# Patient Record
Sex: FEMALE | Race: WHITE | NOT HISPANIC OR LATINO | URBAN - METROPOLITAN AREA
[De-identification: names, ages, dates, MRNs, and addresses within clinical notes are randomized per-mention and may not be internally consistent; named-entity substitution may affect disease eponyms.]

---

## 2023-10-15 PROBLEM — R87.619 ABNORMAL PAP SMEAR OF CERVIX: Status: ACTIVE | Noted: 2023-10-15

## 2023-10-15 PROBLEM — E66.813 CLASS 3 OBESITY WITH ALVEOLAR HYPOVENTILATION AND BODY MASS INDEX (BMI) OF 40.0 TO 44.9 IN ADULT: Status: ACTIVE | Noted: 2023-10-15

## 2023-10-15 PROBLEM — H81.10 BPPV (BENIGN PAROXYSMAL POSITIONAL VERTIGO): Status: ACTIVE | Noted: 2023-10-15

## 2023-10-15 PROBLEM — D17.24 LIPOMA OF LEFT LOWER EXTREMITY: Status: ACTIVE | Noted: 2023-10-15

## 2023-10-15 PROBLEM — Z30.9 CONTRACEPTION MANAGEMENT: Status: ACTIVE | Noted: 2023-10-15

## 2023-10-15 PROBLEM — L68.0 HIRSUTISM: Status: ACTIVE | Noted: 2023-10-15

## 2023-10-15 PROBLEM — R51.9 HEADACHE: Status: ACTIVE | Noted: 2023-10-15

## 2023-10-15 PROBLEM — N76.0 BACTERIAL VAGINOSIS: Status: ACTIVE | Noted: 2023-10-15

## 2023-10-15 PROBLEM — N92.6 IRREGULAR MENSES: Status: ACTIVE | Noted: 2023-10-15

## 2023-10-15 PROBLEM — E66.01 CLASS 3 SEVERE OBESITY DUE TO EXCESS CALORIES WITH BODY MASS INDEX (BMI) OF 40.0 TO 44.9 IN ADULT (MULTI): Status: ACTIVE | Noted: 2023-10-15

## 2023-10-15 PROBLEM — E66.2 CLASS 3 OBESITY WITH ALVEOLAR HYPOVENTILATION AND BODY MASS INDEX (BMI) OF 40.0 TO 44.9 IN ADULT (MULTI): Status: ACTIVE | Noted: 2023-10-15

## 2023-10-15 PROBLEM — B97.7 HPV IN FEMALE: Status: ACTIVE | Noted: 2023-10-15

## 2023-10-15 PROBLEM — B96.89 BACTERIAL VAGINOSIS: Status: ACTIVE | Noted: 2023-10-15

## 2023-10-15 PROBLEM — R42 ORTHOSTATIC DIZZINESS: Status: ACTIVE | Noted: 2023-10-15

## 2023-10-15 PROBLEM — E66.813 CLASS 3 SEVERE OBESITY DUE TO EXCESS CALORIES WITH BODY MASS INDEX (BMI) OF 40.0 TO 44.9 IN ADULT: Status: ACTIVE | Noted: 2023-10-15

## 2023-10-15 PROBLEM — E28.2 PCOS (POLYCYSTIC OVARIAN SYNDROME): Status: ACTIVE | Noted: 2023-10-15

## 2023-10-15 RX ORDER — UBIDECARENONE 75 MG
1 CAPSULE ORAL DAILY
COMMUNITY
Start: 2021-06-24

## 2023-10-15 RX ORDER — ONDANSETRON 4 MG/1
4 TABLET, ORALLY DISINTEGRATING ORAL EVERY 8 HOURS
COMMUNITY
Start: 2018-05-29 | End: 2023-10-17 | Stop reason: ALTCHOICE

## 2023-10-15 RX ORDER — SPIRONOLACTONE 100 MG/1
TABLET, FILM COATED ORAL
COMMUNITY
Start: 2021-11-19 | End: 2024-03-27 | Stop reason: SDUPTHER

## 2023-10-15 RX ORDER — NORETHINDRONE AND ETHINYL ESTRADIOL 1 MG-35MCG
1 KIT ORAL DAILY
COMMUNITY
Start: 2021-06-24 | End: 2023-10-17 | Stop reason: ALTCHOICE

## 2023-10-15 RX ORDER — ASPIRIN 325 MG
50000 TABLET, DELAYED RELEASE (ENTERIC COATED) ORAL
COMMUNITY
Start: 2022-05-12 | End: 2024-02-07 | Stop reason: ALTCHOICE

## 2023-10-15 RX ORDER — FERROUS SULFATE 325(65) MG
65 TABLET ORAL 3 TIMES WEEKLY
COMMUNITY
Start: 2021-06-24 | End: 2024-02-07 | Stop reason: ALTCHOICE

## 2023-10-15 RX ORDER — METFORMIN HYDROCHLORIDE 500 MG/1
1500 TABLET, EXTENDED RELEASE ORAL DAILY
COMMUNITY
Start: 2021-08-16 | End: 2023-10-17 | Stop reason: ALTCHOICE

## 2023-10-15 RX ORDER — CITALOPRAM 20 MG/1
TABLET, FILM COATED ORAL
COMMUNITY
Start: 2021-06-22 | End: 2023-10-17 | Stop reason: ALTCHOICE

## 2023-10-17 ENCOUNTER — OFFICE VISIT (OUTPATIENT)
Dept: OBSTETRICS AND GYNECOLOGY | Facility: CLINIC | Age: 23
End: 2023-10-17
Payer: COMMERCIAL

## 2023-10-17 ENCOUNTER — LAB (OUTPATIENT)
Dept: LAB | Facility: LAB | Age: 23
End: 2023-10-17
Payer: COMMERCIAL

## 2023-10-17 VITALS
HEART RATE: 76 BPM | SYSTOLIC BLOOD PRESSURE: 111 MMHG | DIASTOLIC BLOOD PRESSURE: 74 MMHG | BODY MASS INDEX: 35.52 KG/M2 | HEIGHT: 66 IN | WEIGHT: 221 LBS

## 2023-10-17 DIAGNOSIS — E28.2 PCOS (POLYCYSTIC OVARIAN SYNDROME): ICD-10-CM

## 2023-10-17 DIAGNOSIS — Z12.4 SCREENING FOR MALIGNANT NEOPLASM OF CERVIX: ICD-10-CM

## 2023-10-17 DIAGNOSIS — Z01.411 ENCNTR FOR GYN EXAM (GENERAL) (ROUTINE) W ABNORMAL FINDINGS: Primary | ICD-10-CM

## 2023-10-17 LAB
25(OH)D3 SERPL-MCNC: 43 NG/ML (ref 30–100)
ANION GAP SERPL CALC-SCNC: 13 MMOL/L (ref 10–20)
BUN SERPL-MCNC: 12 MG/DL (ref 6–23)
CALCIUM SERPL-MCNC: 9.3 MG/DL (ref 8.6–10.3)
CHLORIDE SERPL-SCNC: 101 MMOL/L (ref 98–107)
CHOLEST SERPL-MCNC: 199 MG/DL (ref 0–199)
CHOLESTEROL/HDL RATIO: 3.7
CO2 SERPL-SCNC: 27 MMOL/L (ref 21–32)
CREAT SERPL-MCNC: 0.7 MG/DL (ref 0.5–1.05)
EST. AVERAGE GLUCOSE BLD GHB EST-MCNC: 91 MG/DL
GFR SERPL CREATININE-BSD FRML MDRD: >90 ML/MIN/1.73M*2
GLUCOSE SERPL-MCNC: 82 MG/DL (ref 74–99)
HBA1C MFR BLD: 4.8 %
HDLC SERPL-MCNC: 54.1 MG/DL
LDLC SERPL CALC-MCNC: 125 MG/DL
NON HDL CHOLESTEROL: 145 MG/DL (ref 0–149)
POTASSIUM SERPL-SCNC: 4.3 MMOL/L (ref 3.5–5.3)
SODIUM SERPL-SCNC: 137 MMOL/L (ref 136–145)
TRIGL SERPL-MCNC: 101 MG/DL (ref 0–149)
VLDL: 20 MG/DL (ref 0–40)

## 2023-10-17 PROCEDURE — 88175 CYTOPATH C/V AUTO FLUID REDO: CPT | Mod: TC,GCY | Performed by: NURSE PRACTITIONER

## 2023-10-17 PROCEDURE — 80061 LIPID PANEL: CPT

## 2023-10-17 PROCEDURE — 83036 HEMOGLOBIN GLYCOSYLATED A1C: CPT

## 2023-10-17 PROCEDURE — 87661 TRICHOMONAS VAGINALIS AMPLIF: CPT | Performed by: NURSE PRACTITIONER

## 2023-10-17 PROCEDURE — 82306 VITAMIN D 25 HYDROXY: CPT

## 2023-10-17 PROCEDURE — 1036F TOBACCO NON-USER: CPT | Performed by: NURSE PRACTITIONER

## 2023-10-17 PROCEDURE — 88141 CYTOPATH C/V INTERPRET: CPT | Performed by: PATHOLOGY

## 2023-10-17 PROCEDURE — 87800 DETECT AGNT MULT DNA DIREC: CPT | Performed by: NURSE PRACTITIONER

## 2023-10-17 PROCEDURE — 36415 COLL VENOUS BLD VENIPUNCTURE: CPT

## 2023-10-17 PROCEDURE — 99395 PREV VISIT EST AGE 18-39: CPT | Performed by: NURSE PRACTITIONER

## 2023-10-17 PROCEDURE — 99385 PREV VISIT NEW AGE 18-39: CPT | Performed by: NURSE PRACTITIONER

## 2023-10-17 PROCEDURE — 80048 BASIC METABOLIC PNL TOTAL CA: CPT

## 2023-10-17 RX ORDER — DROSPIRENONE AND ETHINYL ESTRADIOL 0.02-3(28)
1 KIT ORAL DAILY
Qty: 84 TABLET | Refills: 3 | Status: SHIPPED | OUTPATIENT
Start: 2023-10-17 | End: 2024-04-09 | Stop reason: SDUPTHER

## 2023-10-17 ASSESSMENT — ANXIETY QUESTIONNAIRES
3. WORRYING TOO MUCH ABOUT DIFFERENT THINGS: MORE THAN HALF THE DAYS
7. FEELING AFRAID AS IF SOMETHING AWFUL MIGHT HAPPEN: MORE THAN HALF THE DAYS
6. BECOMING EASILY ANNOYED OR IRRITABLE: MORE THAN HALF THE DAYS
5. BEING SO RESTLESS THAT IT IS HARD TO SIT STILL: NOT AT ALL
2. NOT BEING ABLE TO STOP OR CONTROL WORRYING: SEVERAL DAYS
4. TROUBLE RELAXING: MORE THAN HALF THE DAYS
IF YOU CHECKED OFF ANY PROBLEMS ON THIS QUESTIONNAIRE, HOW DIFFICULT HAVE THESE PROBLEMS MADE IT FOR YOU TO DO YOUR WORK, TAKE CARE OF THINGS AT HOME, OR GET ALONG WITH OTHER PEOPLE: SOMEWHAT DIFFICULT
GAD7 TOTAL SCORE: 10
1. FEELING NERVOUS, ANXIOUS, OR ON EDGE: SEVERAL DAYS

## 2023-10-17 ASSESSMENT — PATIENT HEALTH QUESTIONNAIRE - PHQ9
10. IF YOU CHECKED OFF ANY PROBLEMS, HOW DIFFICULT HAVE THESE PROBLEMS MADE IT FOR YOU TO DO YOUR WORK, TAKE CARE OF THINGS AT HOME, OR GET ALONG WITH OTHER PEOPLE: SOMEWHAT DIFFICULT
1. LITTLE INTEREST OR PLEASURE IN DOING THINGS: MORE THAN HALF THE DAYS
2. FEELING DOWN, DEPRESSED OR HOPELESS: NOT AT ALL
SUM OF ALL RESPONSES TO PHQ9 QUESTIONS 1 AND 2: 2

## 2023-10-17 ASSESSMENT — COLUMBIA-SUICIDE SEVERITY RATING SCALE - C-SSRS
6. HAVE YOU EVER DONE ANYTHING, STARTED TO DO ANYTHING, OR PREPARED TO DO ANYTHING TO END YOUR LIFE?: NO
2. HAVE YOU ACTUALLY HAD ANY THOUGHTS OF KILLING YOURSELF?: NO

## 2023-10-17 ASSESSMENT — PAIN SCALES - GENERAL: PAINLEVEL: 0-NO PAIN

## 2023-10-17 ASSESSMENT — ENCOUNTER SYMPTOMS
LOSS OF SENSATION IN FEET: 0
OCCASIONAL FEELINGS OF UNSTEADINESS: 0
DEPRESSION: 0

## 2023-10-17 NOTE — PROGRESS NOTES
"Brie Cordova is a 23 y.o. who presents today for her annual gynecologic exam with complaints Has PCOS. Doesn't feel like it is well managed. Has tried oral contraception but had hair loss. On Spironolactone and feels like it is helping.     Concerns with intercourse:  no     Last pap:   2020 ASCUS HPV Not done  History of abnormal pap: yes - All under age 25  HPV vaccine:  unsure  Last mammogram: Never  Colon screen: Never    History of STIs: no    Patient concern for STI: no    Family history of breast, uterine, ovarian or colon cancer:      Past medical, surgical, family and social histories reviewed and updated as needed.    /74   Pulse 76   Ht 1.676 m (5' 6\")   Wt 100 kg (221 lb)   LMP  (LMP Unknown) Comment: No period in almost 11 months, no birth control  BMI 35.67 kg/m²      Physical Exam  Constitutional:       Appearance: Normal appearance.   Genitourinary:      Bladder and rectum normal.      Right Labia: No skin changes or Bartholin's cyst.     Left Labia: No skin changes or Bartholin's cyst.     Vulva exam comments: Normal.      No vaginal prolapse present.     No vaginal atrophy present.     Vaginal exam comments: Normal.      No cervical motion tenderness.      Cervical exam comments: Normal.   Breasts:     Breasts are soft.     Right: Normal.      Left: Normal.   HENT:      Head: Normocephalic.   Pulmonary:      Effort: Pulmonary effort is normal.   Abdominal:      General: There is no distension.      Palpations: Abdomen is soft.   Musculoskeletal:         General: Normal range of motion.      Cervical back: Normal range of motion and neck supple.   Neurological:      General: No focal deficit present.      Mental Status: She is alert and oriented to person, place, and time.   Skin:     General: Skin is warm and dry.   Psychiatric:         Mood and Affect: Mood normal.         Behavior: Behavior normal.         Thought Content: Thought content normal.         Judgment: Judgment normal. "   Vitals and nursing note reviewed.            Diagnoses and all orders for this visit:  Encntr for gyn exam (general) (routine) w abnormal findings  PCOS (polycystic ovarian syndrome)  -     Lipid Panel; Future  -     Basic metabolic panel; Future  -     Hemoglobin A1c; Future  -     Referral to Endocrinology; Future  -     Vitamin D 25-Hydroxy,Total (for eval of Vitamin D levels); Future  -     drospirenone-ethinyl estradioL (Elisa Pierre) 3-0.02 mg tablet; Take 1 tablet by mouth once daily.  Screening for malignant neoplasm of cervix  -     THINPREP PAP    Follow up with Dr. Fowler in 3 months and with Dr. Angelina Beltran for weight management.

## 2023-10-19 LAB
C TRACH RRNA SPEC QL NAA+PROBE: NEGATIVE
N GONORRHOEA DNA SPEC QL PROBE+SIG AMP: NEGATIVE
T VAGINALIS RRNA SPEC QL NAA+PROBE: NEGATIVE

## 2023-10-31 ENCOUNTER — PATIENT MESSAGE (OUTPATIENT)
Dept: OBSTETRICS AND GYNECOLOGY | Facility: CLINIC | Age: 23
End: 2023-10-31
Payer: COMMERCIAL

## 2023-10-31 DIAGNOSIS — B96.89 BACTERIAL VAGINOSIS: Primary | ICD-10-CM

## 2023-10-31 DIAGNOSIS — N76.0 BACTERIAL VAGINOSIS: Primary | ICD-10-CM

## 2023-10-31 LAB
CYTOLOGY CMNT CVX/VAG CYTO-IMP: NORMAL
LAB AP HPV HR: NORMAL
LAB AP PAP ADDITIONAL TESTS: NORMAL
LABORATORY COMMENT REPORT: NORMAL
PATH REPORT.TOTAL CANCER: NORMAL

## 2023-11-01 RX ORDER — METRONIDAZOLE 500 MG/1
500 TABLET ORAL 2 TIMES DAILY
Qty: 14 TABLET | Refills: 0 | Status: SHIPPED | OUTPATIENT
Start: 2023-11-01 | End: 2023-11-08

## 2023-11-02 ENCOUNTER — TELEPHONE (OUTPATIENT)
Dept: PRIMARY CARE | Facility: CLINIC | Age: 23
End: 2023-11-02

## 2023-11-02 ENCOUNTER — APPOINTMENT (OUTPATIENT)
Dept: ENDOCRINOLOGY | Facility: CLINIC | Age: 23
End: 2023-11-02
Payer: COMMERCIAL

## 2024-01-19 ENCOUNTER — APPOINTMENT (OUTPATIENT)
Dept: OBSTETRICS AND GYNECOLOGY | Facility: CLINIC | Age: 24
End: 2024-01-19
Payer: COMMERCIAL

## 2024-02-07 ENCOUNTER — OFFICE VISIT (OUTPATIENT)
Dept: ENDOCRINOLOGY | Facility: CLINIC | Age: 24
End: 2024-02-07
Payer: COMMERCIAL

## 2024-02-07 VITALS
HEART RATE: 92 BPM | WEIGHT: 200.1 LBS | DIASTOLIC BLOOD PRESSURE: 85 MMHG | SYSTOLIC BLOOD PRESSURE: 131 MMHG | HEIGHT: 66 IN | TEMPERATURE: 98.3 F | BODY MASS INDEX: 32.16 KG/M2

## 2024-02-07 DIAGNOSIS — E28.2 PCOS (POLYCYSTIC OVARIAN SYNDROME): ICD-10-CM

## 2024-02-07 DIAGNOSIS — E66.8 CONSTITUTIONAL OBESITY: ICD-10-CM

## 2024-02-07 DIAGNOSIS — Z76.89 ENCOUNTER FOR WEIGHT MANAGEMENT: ICD-10-CM

## 2024-02-07 PROCEDURE — 99204 OFFICE O/P NEW MOD 45 MIN: CPT | Performed by: INTERNAL MEDICINE

## 2024-02-07 PROCEDURE — 1036F TOBACCO NON-USER: CPT | Performed by: INTERNAL MEDICINE

## 2024-02-07 PROCEDURE — 3008F BODY MASS INDEX DOCD: CPT | Performed by: INTERNAL MEDICINE

## 2024-02-07 NOTE — PROGRESS NOTES
Patient is seen at the request of Friend/self for my opinion regarding weight management/pcos. My final recommendations will be communicated back to the requesting provider by way of shared medical record.    NEW  Subjective   Brie Cordova is a 23 y.o. female with a hx of PCOS, hirsutism, obesity who presents for weight management and obesity.    1. Weight history: weight has been an issue since childhood- DX PCOS at 13 years old. Was put on Metformin- did not help.   --Any previous programs: Tried Gluten and dairy free for a year- lost 40lbs    2. Sleep: trouble getting to sleep      3. Stress: 6/10, is a       4. Exercise: not consistent. Walks on the treadmill 2 times a week for 30-40 minutes      5. Appetite control: uncontrolled   Breakfast: 4/7, protein bar, cereal, oatmeal  Lunch: usually skips   Snacking during the day: protein bar, trail mix, crackers  Dinner: protein with vegetable and carb  Snacking after dinner: craving more sugar     Any personal history of pancreatitis?: no  Any personal or family history of medullary thyroid cancer or MEN (multiple endocrine neoplasia)?: Cousin had thyroid cancer- unknown specific type      Current Outpatient Medications:     cyanocobalamin (Vitamin B-12) 500 mcg tablet, Take 1 tablet (500 mcg) by mouth once daily., Disp: , Rfl:     drospirenone-ethinyl estradioL (Gabby, Gianvi) 3-0.02 mg tablet, Take 1 tablet by mouth once daily., Disp: 84 tablet, Rfl: 3    spironolactone (Aldactone) 100 mg tablet, Take by mouth.  TAKE 2 TABLETS BY MOUTH EVERY MORNING AND 1 TABLET EVERY EVENING, Disp: , Rfl:     ROS:  System: normal  Eyes : no visual changes  Neck : no tenderness, no new lumps/bumps  Respiratory : no SOB  Cardiovascular : no chest pain, no palpitations  Gastro-Intestinal : no abdominal concerns  Neurological : no numbness or tingling in the extremities  Musculoskeletal : no joint paint, no muscle pain  Skin : no unusual rashes  Psychiatric : no  depression, no anxiety  See HPI for Endocrine ROS    Past Medical History:   Diagnosis Date    Chlamydial infection, unspecified 08/03/2018    Chlamydia infection    Contact with and (suspected) exposure to infections with a predominantly sexual mode of transmission 01/17/2019    STD exposure    Encounter for initial prescription of contraceptive pills 03/09/2020    Encounter for initial prescription of contraceptive pills    Encounter for screening for infections with a predominantly sexual mode of transmission 03/09/2020    Screening for STDs (sexually transmitted diseases)    Encounter for screening for malignant neoplasm of cervix 04/25/2018    Cervical cancer screening    Encounter for screening for other infectious and parasitic diseases 05/10/2018    Screening for chlamydial disease    Personal history of other diseases of the female genital tract 09/17/2019    History of vaginal discharge    Personal history of other specified conditions 08/01/2018    History of dysuria    Personal history of urinary (tract) infections 05/01/2019    History of urinary tract infection    Trichomonal vulvovaginitis 01/17/2019    Trichomonal vulvovaginitis    Unspecified symptoms and signs involving the genitourinary system 03/09/2020    UTI symptoms    Urinary tract infection, site not specified 11/15/2018    Acute lower UTI       Past Surgical History:   Procedure Laterality Date    OTHER SURGICAL HISTORY  06/17/2020    No history of surgery       Social History     Socioeconomic History    Marital status: Single     Spouse name: Not on file    Number of children: Not on file    Years of education: Not on file    Highest education level: Not on file   Occupational History    Occupation: [employer]ist   Tobacco Use    Smoking status: Never    Smokeless tobacco: Never   Vaping Use    Vaping Use: Every day    Substances: Nicotine    Devices: Disposable   Substance and Sexual Activity    Alcohol use: Yes    Drug use: Yes      "Types: Marijuana    Sexual activity: Yes     Partners: Male   Other Topics Concern    Not on file   Social History Narrative    Not on file     Social Determinants of Health     Financial Resource Strain: Not on file   Food Insecurity: Not on file   Transportation Needs: Not on file   Physical Activity: Not on file   Stress: Not on file   Social Connections: Not on file   Intimate Partner Violence: Not on file   Housing Stability: Not on file       Objective    Physical Exam:  Blood pressure 131/85, pulse 92, temperature 36.8 °C (98.3 °F), height 1.676 m (5' 6\"), weight 90.8 kg (200 lb 1.6 oz).  General : alert and oriented X3, no acute distress  Eyes : EOMI   Neck : non tender, supple  Thyroid : no palpable nodules  Heart : regular rate and rhythm  ABD : no obvious abnormalities, soft to palpation  Extremities : no edema  Neuro : normal  Other :    Assessment/Plan   Brie Cordova is a 23 y.o. female with a hx of PCOS, hirsutism, obesity who presents for weight management and obesity.    1. Weight Management : Reviewed the principles of energy metabolism, caloric intake and expenditure, and rationale for a treatment program.  Also reinforced need for reduced calorie, low fat diet and increased physical activity.    We reviewed the possibility of starting an interdisciplinary lifestyle intervention program involving improvement of the diet, a personalized exercise program, efforts to reduce the stress and the possibility of using appetite suppressant medications in an effort to help with the weight loss process.  The patient expressed interest in the plan.    2. Nutrition : I discussed trying one of the diet approaches we have here in the program : Mediterranean lifestyle, ketosis diet.  The patient will be referred to the Registered Dietician for education on their diet of choice.  The dietary booklet was provided in the visit today.    2/7/24: 200lb, 32.30kg/m2    3. Sleep : trouble getting to sleep    4. Stress " : 6/10, is a      5. Exercise : walking -- encouraged strength training.    6. Appetite : is high a lot.  Discussed AOM's in length.  10/17/23 : bcz4w=3.8%  She will review and let me know.  Also a family member had thyroid cancer - she will ask for more details.    7. Baseline thyroid US in radiology.    Follow up in a group visit.  Kyara Beltran MD

## 2024-02-08 RX ORDER — PHENTERMINE HYDROCHLORIDE 37.5 MG/1
TABLET ORAL
Qty: 30 TABLET | Refills: 2 | Status: SHIPPED | OUTPATIENT
Start: 2024-02-08

## 2024-02-08 NOTE — TELEPHONE ENCOUNTER
----- Message from Brie Cordova sent at 2/8/2024  1:35 PM EST -----  Regarding: Prescription decision   Contact: [phone]  Hi I wanted to thank you for the information during our appt. After some research on side effects, and other’s experiences I would like to start on phentermine, if you still believe that is a good choice for me. I will also be slowly starting the lifestyle stages you suggested and incorporating strength training into my walking sessions ASAP! Thank you in advance!   
LOV: 2/7/24  NOV: not scheduled   
None

## 2024-02-13 ENCOUNTER — NUTRITION (OUTPATIENT)
Dept: ENDOCRINOLOGY | Facility: CLINIC | Age: 24
End: 2024-02-13
Payer: COMMERCIAL

## 2024-02-13 VITALS — HEIGHT: 66 IN | BODY MASS INDEX: 32.3 KG/M2

## 2024-02-13 DIAGNOSIS — Z71.3 DIETARY COUNSELING: Primary | ICD-10-CM

## 2024-02-13 PROCEDURE — 97802 MEDICAL NUTRITION INDIV IN: CPT | Performed by: DIETITIAN, REGISTERED

## 2024-02-13 NOTE — PATIENT INSTRUCTIONS
- Please refer to your book entitled: Your Mediterranean Meal Plan, and handout provided and follow the guidelines as discussed.  - The Healthy Plate style of eating can be a helpful tool for incorporating healthy balanced meals in appropriate portions. (Healthy Plate: Start with a 9-inch diameter plate. Fill 1/2 the plate with non-starchy vegetables, 1/4 of the plate with whole grains or starchy vegetables, and 1/4 of the plate with a lean source of protein.   - Consider pre-planning healthy meals and snacks for the week for both home meals and things to eat when at work. Can refer to Your Mediterranean Meal Plan booklet for menu and recipe ideas to help get you started.  - Consider having a meal replacement bar or protein shake along with a serving of fruit when time does not allow for a meal at work.  - Consider tracking daily food intake for accountability with food choices and portions and aim for 7047-3844 calories daily.   - Can also consider following healthy eating pattern with tracking servings of foods as follows:  - Aim for 2-3 servings of fruit per day.  - Aim for a minimum of 3 servings of non-starchy vegetables per day.  - Non-starchy vegetables are unlimited however!   - Aim for at least 3 servings of legumes (beans and lentils) per week.   - Aim for at least 1 Tbsp. of Extra Virgin Olive Oil (EVOO) daily, and please limit to no more than 4 servings of healthy fats daily.  - Aim for 2-3 servings of omega 3 fatty acid rich fish (herring, mackerel, salmon and tuna) per week.   - Aim for at least 3 servings of nuts (almonds, hazelnuts and walnuts) per week.   - Aim for 4-5 servings of whole grains and non-starchy vegetables per day.  - Aim for 1 serving of fat-free or low-fat dairy (cheese, milk and yogurt) or dairy alternatives per day (almond, coconut, oat and rice).  - Aim for 6-8 ounces of lean protein sources daily such as chicken, turkey, fish, and eggs.  - Avoid red meat (beef, pork, veal and  lamb) OR limit to no more than 3-4 ounces per week.  - Can consider incorporating intermittent fasting with eating window of 8 hours daily and fasting of 16 hours daily as discussed.  - Aim for 64 ounces of water daily.  - Aim for 150 minutes of moderate-intensity physical activity per week.   - Follow-up as scheduled with Dr. Angelina Beltran.  - Follow-up with nutrition in as scheduled.

## 2024-02-13 NOTE — PROGRESS NOTES
"Initial Nutrition Assessment    Patient was referred to nutrition by Dr. Angelina Beltran for weight management/desire to lose weight. Other PMHX significant for PCOS.      Diet recall reveals an inconsistent meal pattern with frequently missed meals, as well as reported intakes of some calorically dense foods/processed snack foods all likely contributing to lack of desired weight loss/contributing to weight gain over time. Fluids meeting recommendations in type and amount with water as primary beverage. Pt is incorporating consistent physical activity, meeting low-end of weekly recommendations. See all interventions/recommendations below as discussed during visit this day.      Patient reported symptoms: Difficulty losing weight    Anthropometrics:  Height:   Ht Readings from Last 1 Encounters:   02/07/24 1.676 m (5' 6\")      Weight:   Wt Readings from Last 10 Encounters:   02/07/24 90.8 kg (200 lb 1.6 oz)   10/17/23 100 kg (221 lb)   10/04/22 97.5 kg (215 lb)   11/08/21 103 kg (227 lb)   08/16/21 103 kg (227 lb 5 oz)   04/22/21 96.6 kg (213 lb)   01/28/21 99.4 kg (219 lb 1 oz)   08/13/20 103 kg (228 lb)   06/16/20 99.5 kg (219 lb 6 oz)   03/09/20 101 kg (223 lb)      Current BMI:   BMI Readings from Last 1 Encounters:   02/07/24 32.30 kg/m²        Labs:  Lab Results   Component Value Date    HGBA1C 4.8 10/17/2023      Lab Results   Component Value Date    CHOL 199 10/17/2023     Lab Results   Component Value Date    HDL 54.1 10/17/2023     Lab Results   Component Value Date    LDLCALC 125 (H) 10/17/2023     Lab Results   Component Value Date    TRIG 101 10/17/2023       Malnutrition Screening:  Significant Unintentional weight loss: No  Eating less than 75% of usual intake for more than 2 weeks: No  Potential Signs of Inflammation: No    Recommended Malnutrition Diagnosis: No malnutrition identified    Diet Recall-  Breakfast- skips OR has a protein bar at work when time allows  Lunch- snacks only usually on " things as below  Dinner- various proteins, starches and vegetables  Daily Snacks- granola bars, protein bars, trail mix plus other snacks in the evenings after work which can vary  Beverages- water throughout the day (80+ ounces daily), occasional OJ (twice weekly)  Alcohol- glass of wine once weekly or less   Physical Activity- treadmill for 30 minutes a couple days a week    Other pertinent patient reported Information:  - Past weight loss attempts include: dairy and gluten-free in the past with success with weight loss   - Works as a a [employer] with frequent inability to eat depending upon schedule  - Snacks on various things at work versus eating meals as a result  - Snacking increased amounts at night with snack/processed foods by report which she feels is biggest obstacle with weight loss at this time.    Nutrition Diagnosis: Food and nutrition-related knowledge deficit related to lack of prior exposure to information as evidenced by BMI above normative standard for age and gender.    Readiness to Learn:  Cognitive ability: Alert and oriented  Motivation to learn: Interested  Family Support: Unable to assess- family not present  Instruction provided to: Patient  Patient learns best by: Multiple methods  Factors affecting learning: None   Physical limitations affecting learning: None    Education Materials Provided:   Your Mediterranean Meal Plan Booklet provided during recent visit with Dr. Angelina Beltran and reviewed during visit this day. Meal Replacement Options Handout and Mediterranean diet handout.    Nutrition Interventions/Recommendations for 2/13/2024:  - Please refer to your book entitled: Your Mediterranean Meal Plan, and handout provided and follow the guidelines as discussed.  - The Healthy Plate style of eating can be a helpful tool for incorporating healthy balanced meals in appropriate portions. (Healthy Plate: Start with a 9-inch diameter plate. Fill 1/2 the plate with non-starchy  vegetables, 1/4 of the plate with whole grains or starchy vegetables, and 1/4 of the plate with a lean source of protein.   - Consider pre-planning healthy meals and snacks for the week for both home meals and things to eat when at work. Can refer to Your Mediterranean Meal Plan booklet for menu and recipe ideas to help get you started.  - Consider having a meal replacement bar or protein shake along with a serving of fruit when time does not allow for a meal at work.  - Consider tracking daily food intake for accountability with food choices and portions and aim for 4752-3172 calories daily.   - Can also consider following healthy eating pattern with tracking servings of foods as follows:  - Aim for 2-3 servings of fruit per day.  - Aim for a minimum of 3 servings of non-starchy vegetables per day.  - Non-starchy vegetables are unlimited however!   - Aim for at least 3 servings of legumes (beans and lentils) per week.   - Aim for at least 1 Tbsp. of Extra Virgin Olive Oil (EVOO) daily, and please limit to no more than 4 servings of healthy fats daily.  - Aim for 2-3 servings of omega 3 fatty acid rich fish (herring, mackerel, salmon and tuna) per week.   - Aim for at least 3 servings of nuts (almonds, hazelnuts and walnuts) per week.   - Aim for 4-5 servings of whole grains and non-starchy vegetables per day.  - Aim for 1 serving of fat-free or low-fat dairy (cheese, milk and yogurt) or dairy alternatives per day (almond, coconut, oat and rice).  - Aim for 6-8 ounces of lean protein sources daily such as chicken, turkey, fish, and eggs.  - Avoid red meat (beef, pork, veal and lamb) OR limit to no more than 3-4 ounces per week.  - Can consider incorporating intermittent fasting with eating window of 8 hours daily and fasting of 16 hours daily as discussed.  - Aim for 64 ounces of water daily.  - Aim for 150 minutes of moderate-intensity physical activity per week.   - Follow-up as scheduled with Dr. Alfaro  Devin.  - Follow-up with nutrition in as scheduled.      Nutrition Monitoring & Evaluation: Weight loss of 1-2 lbs per week and adherence to recommendations and patient stated goals    Need for follow-up: Individual Nutrition Visit in 4-6 weeks    Referred by: Dr. Angelina Beltran    [facility] Billing Type: Medical Nutrition Assessment, each 15 min increment, for 4 increments.    SIGNATURE:   Leydi Adler RD, LD, CDCES                                                             DATE:   2/13/2024

## 2024-02-22 ENCOUNTER — HOSPITAL ENCOUNTER (OUTPATIENT)
Dept: RADIOLOGY | Facility: HOSPITAL | Age: 24
Discharge: HOME | End: 2024-02-22
Payer: COMMERCIAL

## 2024-02-22 DIAGNOSIS — E66.8 CONSTITUTIONAL OBESITY: ICD-10-CM

## 2024-02-22 PROCEDURE — 76536 US EXAM OF HEAD AND NECK: CPT

## 2024-02-22 PROCEDURE — 76536 US EXAM OF HEAD AND NECK: CPT | Performed by: RADIOLOGY

## 2024-02-24 ENCOUNTER — TELEPHONE (OUTPATIENT)
Dept: ENDOCRINOLOGY | Facility: CLINIC | Age: 24
End: 2024-02-24
Payer: COMMERCIAL

## 2024-02-24 NOTE — TELEPHONE ENCOUNTER
US THYROID; ;  2/22/2024     FINDINGS:  Thyroid gland is not enlarged and the thyroid parenchyma appears  homogeneous. Right thyroid lobe measures 5.8 x 1.5 x 1.6 cm in the  left thyroid lobe measures 5.3 x 1.1 x 1.6 cm. Thyroid isthmus  measures 2 mm in AP diameter. No discrete nodule is demonstrated.      IMPRESSION:  Unremarkable thyroid ultrasound.          MACRO:  None      Signed by: Manuel Rosales 2/23/2024 5:50 PM  Dictation workstation:   [id number]  ---------------------------------------------------------------    Please let her know : the thyroid US was NORMAL.  Thanks,  Kyara Beltran MD

## 2024-03-26 ENCOUNTER — TELEPHONE (OUTPATIENT)
Dept: ENDOCRINOLOGY | Facility: CLINIC | Age: 24
End: 2024-03-26

## 2024-03-26 ENCOUNTER — APPOINTMENT (OUTPATIENT)
Dept: ENDOCRINOLOGY | Facility: CLINIC | Age: 24
End: 2024-03-26
Payer: COMMERCIAL

## 2024-03-26 NOTE — TELEPHONE ENCOUNTER
Patient is asking if Dr. Saida Beltran will fill a script for spironolactone 100 mg tablets that a different provider prescribed earlier Called patient and left message letting her Dr. Yusuf is out of the office but message will be sent to her for her to determine if she wants patient to continue this medication. Lauren Jaime LPN

## 2024-03-27 DIAGNOSIS — E28.2 PCOS (POLYCYSTIC OVARIAN SYNDROME): Primary | ICD-10-CM

## 2024-03-27 RX ORDER — SPIRONOLACTONE 100 MG/1
100 TABLET, FILM COATED ORAL 2 TIMES DAILY
Qty: 60 TABLET | Refills: 0 | Status: SHIPPED | OUTPATIENT
Start: 2024-03-27 | End: 2024-04-26

## 2024-03-27 NOTE — TELEPHONE ENCOUNTER
Spoke to the patient and she used to see Doretha Terry CNP before she left. Needs refill of her spironolactone to get her to the 4/9/24 appt. Confirmed dosage, patient states she takes 100 mg morning and night.   Order pended and sent to Dr. Crockett.

## 2024-04-05 NOTE — TELEPHONE ENCOUNTER
Patient sent [employer] message stating that she will make an appointment with a gynecologist to get the script filled.

## 2024-04-09 ENCOUNTER — OFFICE VISIT (OUTPATIENT)
Dept: OBSTETRICS AND GYNECOLOGY | Facility: CLINIC | Age: 24
End: 2024-04-09
Payer: COMMERCIAL

## 2024-04-09 VITALS
SYSTOLIC BLOOD PRESSURE: 134 MMHG | BODY MASS INDEX: 32.62 KG/M2 | DIASTOLIC BLOOD PRESSURE: 71 MMHG | HEIGHT: 66 IN | WEIGHT: 203 LBS

## 2024-04-09 DIAGNOSIS — E28.2 PCOS (POLYCYSTIC OVARIAN SYNDROME): Primary | ICD-10-CM

## 2024-04-09 PROCEDURE — 99214 OFFICE O/P EST MOD 30 MIN: CPT | Performed by: OBSTETRICS & GYNECOLOGY

## 2024-04-09 PROCEDURE — 1036F TOBACCO NON-USER: CPT | Performed by: OBSTETRICS & GYNECOLOGY

## 2024-04-09 PROCEDURE — 3008F BODY MASS INDEX DOCD: CPT | Performed by: OBSTETRICS & GYNECOLOGY

## 2024-04-09 RX ORDER — DROSPIRENONE AND ETHINYL ESTRADIOL 0.02-3(28)
1 KIT ORAL DAILY
Qty: 84 TABLET | Refills: 3 | Status: SHIPPED | OUTPATIENT
Start: 2024-04-09

## 2024-04-09 NOTE — PROGRESS NOTES
Brie Cordova is a 23 y.o. female who presents with a chief complaint of Establish Care (Establish care after previous gyn no longer with practice/No complaints)      SUBJECTIVE  Patient presents to establish with new physician.  She has a history of PCOS and is currently on birth control and spironolactone.  She had she tried metformin in the past but does not feel like she got a lot out of it.  She does need a refill on her spironolactone.  She is due for her annual in October    Past Medical History:   Diagnosis Date    Chlamydial infection, unspecified 08/03/2018    Chlamydia infection    Contact with and (suspected) exposure to infections with a predominantly sexual mode of transmission 01/17/2019    STD exposure    Encounter for initial prescription of contraceptive pills 03/09/2020    Encounter for initial prescription of contraceptive pills    Encounter for screening for infections with a predominantly sexual mode of transmission 03/09/2020    Screening for STDs (sexually transmitted diseases)    Encounter for screening for malignant neoplasm of cervix 04/25/2018    Cervical cancer screening    Encounter for screening for other infectious and parasitic diseases 05/10/2018    Screening for chlamydial disease    Personal history of other diseases of the female genital tract 09/17/2019    History of vaginal discharge    Personal history of other specified conditions 08/01/2018    History of dysuria    Personal history of urinary (tract) infections 05/01/2019    History of urinary tract infection    Trichomonal vulvovaginitis 01/17/2019    Trichomonal vulvovaginitis    Unspecified symptoms and signs involving the genitourinary system 03/09/2020    UTI symptoms    Urinary tract infection, site not specified 11/15/2018    Acute lower UTI     Past Surgical History:   Procedure Laterality Date    OTHER SURGICAL HISTORY  06/17/2020    No history of surgery     Social History     Socioeconomic History    Marital  "status: Single     Spouse name: None    Number of children: None    Years of education: None    Highest education level: None   Occupational History    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   Tobacco Use    Smoking status: Never    Smokeless tobacco: Never   Vaping Use    Vaping Use: Some days    Substances: Nicotine    Devices: Disposable   Substance and Sexual Activity    Alcohol use: Yes    Drug use: Yes     Types: Marijuana    Sexual activity: Yes     Partners: Male   Other Topics Concern    None   Social History Narrative    None     Social Determinants of Health     Financial Resource Strain: Not on file   Food Insecurity: Not on file   Transportation Needs: Not on file   Physical Activity: Not on file   Stress: Not on file   Social Connections: Not on file   Intimate Partner Violence: Not on file   Housing Stability: Not on file     Family History   Problem Relation Name Age of Onset    No Known Problems Mother      No Known Problems Father         OB History    Para Term  AB Living   0 0 0 0 0 0   SAB IAB Ectopic Multiple Live Births   0 0 0 0 0       OBJECTIVE  No Known Allergies   (Not in a hospital admission)       Review of Systems  History obtained from the patient  General ROS: negative  Psychological ROS: negative  Gastrointestinal ROS: negative  Musculoskeletal ROS: negative  Physical Exam  General Appearance: awake, alert, oriented, in no acute distress, well developed, well nourished, and in no acute distress  Skin: there are no suspicious lesions or rashes of concern, skin color, texture, turgor are normal; there are no bruises, rashes or lesions.  Head/Face: NCAT  Eyes: No gross abnormalities., PERRL, and EOMI  Neck: neck- supple, no mass, non-tender  Back: no pain to palpation  Abdomen: Soft, non-tender, normal bowel sounds; no bruits, organomegaly or masses.  Extremities: Extremities warm to touch, pink, with no edema.  Musculoskeletal: negative    /71   Ht 1.676 m (5' 6\")   Wt 92.1 kg " (203 lb)   LMP 03/18/2024   BMI 32.77 kg/m²    Problem List Items Addressed This Visit       PCOS (polycystic ovarian syndrome) - Primary    Refill spirolactone for 1 yr  Annual October

## 2024-05-14 ENCOUNTER — NUTRITION (OUTPATIENT)
Dept: ENDOCRINOLOGY | Facility: CLINIC | Age: 24
End: 2024-05-14
Payer: COMMERCIAL

## 2024-05-14 VITALS — HEIGHT: 66 IN | BODY MASS INDEX: 31.98 KG/M2 | WEIGHT: 199 LBS

## 2024-05-14 DIAGNOSIS — Z71.3 DIETARY COUNSELING: ICD-10-CM

## 2024-05-14 PROCEDURE — 97803 MED NUTRITION INDIV SUBSEQ: CPT | Performed by: DIETITIAN, REGISTERED

## 2024-05-14 NOTE — PATIENT INSTRUCTIONS
- Continue to incorporate guidelines of the Mediterranean diet and the Healthy Plate to assist in achieving goals.   - Continue to pre-plan healthy meals and snacks for the week for both home meals and things to eat when at work.   - Continue tracking daily food intake for accountability with food choices and portions and aim for 8237-3242 calories daily.   - Can consider incorporating intermittent fasting with eating window of 8 hours daily and fasting of 16 hours daily as discussed again this day but remember, the eating window can end earlier in the evening to help reduce night time eating as discussed.  - Keep up the great work with your daily water intakes.  - Aim for 150 minutes of moderate-intensity physical activity per week and aim for twice weekly resistance training.  - Follow-up as scheduled with Dr. Angelina Beltran.  - Follow-up with nutrition in two months as scheduled.

## 2024-05-14 NOTE — PROGRESS NOTES
Follow-up Nutrition Assessment    Interval History: Patient presents for follow-up nutrition appointment for weight management/desire to lose weight. Since last nutrition visit on 2/13/24, pt with a 1 lbs weight loss; however, weight had increased to 203 lbs in April so pt with a successful 4 lbs weight loss since that time. Taking medication Phentermine and feels it is helping with appetite but that the effects are wearing off around 6 PM now so not helping as well as it was initially. This will be communicated to Dr. Alfaro for advisement.    Pt reports she is now working three-12 hour shifts a week and loving it. Finding it much easier to adhere to a consistent schedule with eating and exercise. Continues to work on incorporating healthy eating most often. Understands more now however that this is a lifestyle change and can't be looked at as a short term diet that is then stopped. Has been tracking food intake on an rony which she likes as it heightens awareness. Adding in more walking with new work schedule as well as maintaining treadmill work outs several times weekly. States she wants to start to incorporate resistance training soon as well. Still doing well with daily water intakes.       Nutrition Interventions/Recommendations from last visit scheduled on 2/13/24:  - Please refer to your book entitled: Your Mediterranean Meal Plan, and handout provided and follow the guidelines as discussed.  - The Healthy Plate style of eating can be a helpful tool for incorporating healthy balanced meals in appropriate portions. (Healthy Plate: Start with a 9-inch diameter plate. Fill 1/2 the plate with non-starchy vegetables, 1/4 of the plate with whole grains or starchy vegetables, and 1/4 of the plate with a lean source of protein.   - Consider pre-planning healthy meals and snacks for the week for both home meals and things to eat when at work. Can refer to Your Mediterranean Meal Plan booklet for menu and recipe  "ideas to help get you started.  - Consider having a meal replacement bar or protein shake along with a serving of fruit when time does not allow for a meal at work.  - Consider tracking daily food intake for accountability with food choices and portions and aim for 2365-2697 calories daily.   - Can also consider following healthy eating pattern with tracking servings of foods as follows:  - Aim for 2-3 servings of fruit per day.  - Aim for a minimum of 3 servings of non-starchy vegetables per day.  - Non-starchy vegetables are unlimited however!   - Aim for at least 3 servings of legumes (beans and lentils) per week.   - Aim for at least 1 Tbsp. of Extra Virgin Olive Oil (EVOO) daily, and please limit to no more than 4 servings of healthy fats daily.  - Aim for 2-3 servings of omega 3 fatty acid rich fish (herring, mackerel, salmon and tuna) per week.   - Aim for at least 3 servings of nuts (almonds, hazelnuts and walnuts) per week.   - Aim for 4-5 servings of whole grains and non-starchy vegetables per day.  - Aim for 1 serving of fat-free or low-fat dairy (cheese, milk and yogurt) or dairy alternatives per day (almond, coconut, oat and rice).  - Aim for 6-8 ounces of lean protein sources daily such as chicken, turkey, fish, and eggs.  - Avoid red meat (beef, pork, veal and lamb) OR limit to no more than 3-4 ounces per week.  - Can consider incorporating intermittent fasting with eating window of 8 hours daily and fasting of 16 hours daily as discussed.  - Aim for 64 ounces of water daily.  - Aim for 150 minutes of moderate-intensity physical activity per week.   - Follow-up as scheduled with Dr. Angelina Beltran.  - Follow-up with nutrition in as scheduled.       Anthropometrics:  Height:   Ht Readings from Last 1 Encounters:   04/09/24 1.676 m (5' 6\")      Weight:   Wt Readings from Last 10 Encounters:   04/09/24 92.1 kg (203 lb)   02/07/24 90.8 kg (200 lb 1.6 oz)   10/17/23 100 kg (221 lb)   10/04/22 97.5 kg " (215 lb)   11/08/21 103 kg (227 lb)   08/16/21 103 kg (227 lb 5 oz)   04/22/21 96.6 kg (213 lb)   01/28/21 99.4 kg (219 lb 1 oz)   08/13/20 103 kg (228 lb)   06/16/20 99.5 kg (219 lb 6 oz)      Current BMI:   BMI Readings from Last 1 Encounters:   04/09/24 32.77 kg/m²        Malnutrition Screening:  Significant Unintentional weight loss: No  Eating less than 75% of usual intake for more than 2 weeks: No  Potential Signs of Inflammation: No    Recommended Malnutrition Diagnosis: No malnutrition identified    Patient reported Information:  - States she is working three-12 hour shifts a week and loving it.  - Finding it much easier to adhere to a consistent schedule with eating and exercise.  - Tracking food intake on an rony which she likes as it heightens awareness.  - Continues to work on incorporating healthy eating most often. Understands more now however that this is a lifestyle change and can't be looked at as a short term diet that is then stopped.  - Still considering intermittent fasting and with good questions about such during visit. Feels eating in the evenings sometimes creating barriers with goals so discussed possibly stopping eating window earlier to help with avoiding over eating in the evenings. Pt states she wants to think about this.  - Adding in more walking with new work schedule as well as maintaining treadmill work outs several times weekly.  - Wants to start to incorporate resistance training soon.  - Still doing well with daily water intakes.  - Taking Phentermine and feels it is helping with appetite but that the effects are wearing off around 6 PM so not helping as well as it was in the beginning.    Nutrition Diagnosis: Food and nutrition-related knowledge deficit related to lack of prior exposure to information as evidenced by BMI above normative standard for age and gender.     Readiness to Learn:  Cognitive ability: Alert and oriented  Motivation to learn: Interested  Family Support:  Unable to assess- family not present  Instruction provided to: Patient  Patient learns best by: Multiple methods  Factors affecting learning: None   Physical limitations affecting learning: None    Education Materials Provided:   None this visit    Nutrition Interventions/Recommendations for 5/14/2024:  - Continue to incorporate guidelines of the Mediterranean diet and the Healthy Plate to assist in achieving goals.   - Continue to pre-plan healthy meals and snacks for the week for both home meals and things to eat when at work.   - Continue tracking daily food intake for accountability with food choices and portions and aim for 8906-7530 calories daily.   - Can consider incorporating intermittent fasting with eating window of 8 hours daily and fasting of 16 hours daily as discussed again this day but remember, the eating window can end earlier in the evening to help reduce night time eating as discussed.  - Keep up the great work with your daily water intakes.  - Aim for 150 minutes of moderate-intensity physical activity per week and aim for twice weekly resistance training.  - Follow-up as scheduled with Dr. Angelina Beltran.  - Follow-up with nutrition in two months as scheduled.      Nutrition Monitoring & Evaluation: adherence to recommendations and patient stated goals    Need for follow-up: As scheduled for Dr. Yusuf's Shared Medical Appointment (SMA) and nutrition in 2 months    Referred by: Dr. Angelina Beltran    MNT Billing Type: Medical Nutrition Re-Assessment, each 15 min increment, for 2 increments.    SIGNATURE:   Leydi Adler RD, LD, [facility]                                                             DATE:   5/14/2024

## 2024-05-24 ENCOUNTER — TELEPHONE (OUTPATIENT)
Dept: ENDOCRINOLOGY | Facility: CLINIC | Age: 24
End: 2024-05-24

## 2024-05-24 NOTE — TELEPHONE ENCOUNTER
----- Message from Kyara Beltran MD sent at 5/24/2024  9:59 AM EDT -----  Regarding: FW: Phentermine  Please let her know to try to take the phentermine later in the day - maybe around lunch time.  Not to worry - we will continue to fill the phentermine - and maybe she can see if there is a group cancellation list?    ----- Message -----  From: Leydi Adler, SUSAN, LD  Sent: 5/14/2024  10:48 AM EDT  To: Kyara Beltran MD; #  Subject: Phentermine                                      Pt was seen today and states she has been taking medication Phentermine 37.5 mg tab once daily in AM (prescribed in February) and feels it is helping with appetite but that the effects are now wearing off around 6 PM. She is asking if either the dosage or timing of medication should be changed. Also wants to know if maintaining medication will be a problem, as her visit previously scheduled for June was cancelled by the office and rescheduled to September.    Thanks,  Leydi

## 2024-06-11 ENCOUNTER — APPOINTMENT (OUTPATIENT)
Dept: ENDOCRINOLOGY | Facility: CLINIC | Age: 24
End: 2024-06-11
Payer: COMMERCIAL

## 2024-06-19 ENCOUNTER — APPOINTMENT (OUTPATIENT)
Dept: ENDOCRINOLOGY | Facility: CLINIC | Age: 24
End: 2024-06-19
Payer: COMMERCIAL

## 2024-06-19 ENCOUNTER — PATIENT MESSAGE (OUTPATIENT)
Dept: OBSTETRICS AND GYNECOLOGY | Facility: CLINIC | Age: 24
End: 2024-06-19

## 2024-06-19 VITALS
HEART RATE: 80 BPM | SYSTOLIC BLOOD PRESSURE: 108 MMHG | WEIGHT: 199.6 LBS | DIASTOLIC BLOOD PRESSURE: 74 MMHG | TEMPERATURE: 98.2 F | HEIGHT: 66 IN | BODY MASS INDEX: 32.08 KG/M2

## 2024-06-19 DIAGNOSIS — E28.2 PCOS (POLYCYSTIC OVARIAN SYNDROME): ICD-10-CM

## 2024-06-19 DIAGNOSIS — Z76.89 ENCOUNTER FOR WEIGHT MANAGEMENT: ICD-10-CM

## 2024-06-19 DIAGNOSIS — Z71.3 DIETARY COUNSELING: Primary | ICD-10-CM

## 2024-06-19 PROCEDURE — 3008F BODY MASS INDEX DOCD: CPT | Performed by: INTERNAL MEDICINE

## 2024-06-19 PROCEDURE — 99215 OFFICE O/P EST HI 40 MIN: CPT | Performed by: INTERNAL MEDICINE

## 2024-06-19 RX ORDER — PHENTERMINE HYDROCHLORIDE 37.5 MG/1
TABLET ORAL
Qty: 30 TABLET | Refills: 2 | Status: SHIPPED | OUTPATIENT
Start: 2024-06-19

## 2024-06-19 RX ORDER — SPIRONOLACTONE 100 MG/1
100 TABLET, FILM COATED ORAL 2 TIMES DAILY
Qty: 60 TABLET | Refills: 0 | Status: SHIPPED | OUTPATIENT
Start: 2024-06-19 | End: 2024-07-19

## 2024-06-19 NOTE — TELEPHONE ENCOUNTER
From: Brie Cordova  To: Bony Montalvo  Sent: 6/19/2024 10:37 AM EDT  Subject: Refill     Hi I was wondering if you could please refill my spironolactone 100mg prescription to [facility] in [place] since at our visit in April you said to reach out if I needed it refilled!     Also if you think a higher dosage would be more effective I am open to trying that but if you think I should stick with 100 then I am fine with that also. Thanks for your help!

## 2024-06-19 NOTE — PROGRESS NOTES
Subjective   Brie Cordova is a 24 y.o. female with a hx of PCOS, hirsutism, obesity who presents for weight management and obesity.    1. Nutrition: eating on smaller plates now.     2. Sleep: stable      3. Stress: managed. Has new job- works at  [employer], has moved     4. Exercise: walks on treadmill 2 times per week      5. Appetite control: controlled   AOM currently taking: Phentermine     6. Goal: get back on track with meals      Current Outpatient Medications:     cyanocobalamin (Vitamin B-12) 500 mcg tablet, Take 1 tablet (500 mcg) by mouth once daily., Disp: , Rfl:     drospirenone-ethinyl estradioL (Gabby, Gianvi) 3-0.02 mg tablet, Take 1 tablet by mouth once daily., Disp: 84 tablet, Rfl: 3    phentermine (Adipex-P) 37.5 mg tablet, Take one tablet by mouth once a day. BMI 32.30kg/m2, Disp: 30 tablet, Rfl: 2    spironolactone (Aldactone) 100 mg tablet, Take 1 tablet (100 mg) by mouth 2 times a day., Disp: 60 tablet, Rfl: 0    ROS:  System: normal  Eyes : no visual changes  Neck : no tenderness, no new lumps/bumps  Respiratory : no SOB  Cardiovascular : no chest pain, no palpitations  Gastro-Intestinal : no abdominal concerns  Neurological : no numbness or tingling in the extremities  Musculoskeletal : no joint paint, no muscle pain  Skin : no unusual rashes  Psychiatric : no depression, no anxiety  See HPI for Endocrine ROS    Past Medical History:   Diagnosis Date    Chlamydial infection, unspecified 08/03/2018    Chlamydia infection    Contact with and (suspected) exposure to infections with a predominantly sexual mode of transmission 01/17/2019    STD exposure    Encounter for initial prescription of contraceptive pills 03/09/2020    Encounter for initial prescription of contraceptive pills    Encounter for screening for infections with a predominantly sexual mode of transmission 03/09/2020    Screening for STDs (sexually transmitted diseases)    Encounter for screening for malignant neoplasm of  cervix 04/25/2018    Cervical cancer screening    Encounter for screening for other infectious and parasitic diseases 05/10/2018    Screening for chlamydial disease    Personal history of other diseases of the female genital tract 09/17/2019    History of vaginal discharge    Personal history of other specified conditions 08/01/2018    History of dysuria    Personal history of urinary (tract) infections 05/01/2019    History of urinary tract infection    Trichomonal vulvovaginitis 01/17/2019    Trichomonal vulvovaginitis    Unspecified symptoms and signs involving the genitourinary system 03/09/2020    UTI symptoms    Urinary tract infection, site not specified 11/15/2018    Acute lower UTI       Past Surgical History:   Procedure Laterality Date    OTHER SURGICAL HISTORY  06/17/2020    No history of surgery       Social History     Socioeconomic History    Marital status: Single     Spouse name: Not on file    Number of children: Not on file    Years of education: Not on file    Highest education level: Not on file   Occupational History    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   Tobacco Use    Smoking status: Never    Smokeless tobacco: Never   Vaping Use    Vaping status: Some Days    Substances: Nicotine    Devices: Disposable   Substance and Sexual Activity    Alcohol use: Yes    Drug use: Yes     Types: Marijuana    Sexual activity: Yes     Partners: Male   Other Topics Concern    Not on file   Social History Narrative    Not on file     Social Determinants of Health     Financial Resource Strain: Not on file   Food Insecurity: Not on file   Transportation Needs: Not on file   Physical Activity: Not on file   Stress: Not on file   Social Connections: Not on file   Intimate Partner Violence: Not on file   Housing Stability: Not on file       Objective    Physical Exam:  There were no vitals taken for this visit.  General : alert and oriented X3, no acute distress  Eyes : EOMI     Assessment/Plan   Brie Cordova is a 24  y.o. female with a hx of PCOS, hirsutism, obesity who presents for weight management and obesity.     1. Weight Management : Reviewed the principles of energy metabolism, caloric intake and expenditure, and rationale for a treatment program.  Also reinforced need for reduced calorie, low fat diet and increased physical activity.     We reviewed the possibility of starting an interdisciplinary lifestyle intervention program involving improvement of the diet, a personalized exercise program, efforts to reduce the stress and the possibility of using appetite suppressant medications in an effort to help with the weight loss process.  The patient expressed interest in the plan.     2. Nutrition : I discussed trying one of the diet approaches we have here in the program : Mediterranean lifestyle, ketosis diet.  Smaller plates!     2/7/24: 200lb, 32.30kg/m2  6/19/24: 199.6lb, 32.22kg/m2     3. Sleep : trouble getting to sleep     4. Stress : 6/10, was a  and now works at the  [employer].     5. Exercise : walking -- encouraged strength training.     6. Appetite : is high a lot.  Discussed AOM's in length.  10/17/23 : glx8q=6.8%  On Phentermine - continue.     7. Goal: to get back to routine (transitioned jobs and was off her routine).     Follow up in a group visit.  Kyara Beltran MD

## 2024-07-12 DIAGNOSIS — E28.2 PCOS (POLYCYSTIC OVARIAN SYNDROME): ICD-10-CM

## 2024-07-12 RX ORDER — SPIRONOLACTONE 100 MG/1
100 TABLET, FILM COATED ORAL 2 TIMES DAILY
Qty: 180 TABLET | Refills: 1 | Status: SHIPPED | OUTPATIENT
Start: 2024-07-12

## 2024-07-15 ENCOUNTER — APPOINTMENT (OUTPATIENT)
Dept: ENDOCRINOLOGY | Facility: CLINIC | Age: 24
End: 2024-07-15
Payer: COMMERCIAL

## 2024-09-16 ENCOUNTER — APPOINTMENT (OUTPATIENT)
Dept: ENDOCRINOLOGY | Facility: CLINIC | Age: 24
End: 2024-09-16
Payer: COMMERCIAL

## 2024-10-07 ENCOUNTER — TELEPHONE (OUTPATIENT)
Dept: ENDOCRINOLOGY | Facility: CLINIC | Age: 24
End: 2024-10-07
Payer: COMMERCIAL

## 2024-10-15 ENCOUNTER — LAB (OUTPATIENT)
Dept: LAB | Facility: LAB | Age: 24
End: 2024-10-15
Payer: COMMERCIAL

## 2024-10-15 ENCOUNTER — APPOINTMENT (OUTPATIENT)
Dept: OBSTETRICS AND GYNECOLOGY | Facility: CLINIC | Age: 24
End: 2024-10-15
Payer: COMMERCIAL

## 2024-10-15 VITALS
HEIGHT: 67 IN | BODY MASS INDEX: 32.05 KG/M2 | SYSTOLIC BLOOD PRESSURE: 118 MMHG | WEIGHT: 204.2 LBS | DIASTOLIC BLOOD PRESSURE: 82 MMHG

## 2024-10-15 DIAGNOSIS — Z01.419 WOMEN'S ANNUAL ROUTINE GYNECOLOGICAL EXAMINATION: Primary | ICD-10-CM

## 2024-10-15 DIAGNOSIS — E28.2 PCOS (POLYCYSTIC OVARIAN SYNDROME): ICD-10-CM

## 2024-10-15 LAB
DHEA-S SERPL-MCNC: 279 UG/DL (ref 65–395)
EST. AVERAGE GLUCOSE BLD GHB EST-MCNC: 88 MG/DL
HBA1C MFR BLD: 4.7 %
PROLACTIN SERPL-MCNC: 10.6 UG/L (ref 3–20)
TESTOST SERPL-MCNC: 35 NG/DL (ref 0–70)
TSH SERPL-ACNC: 1.83 MIU/L (ref 0.44–3.98)

## 2024-10-15 PROCEDURE — 84403 ASSAY OF TOTAL TESTOSTERONE: CPT

## 2024-10-15 PROCEDURE — 3008F BODY MASS INDEX DOCD: CPT | Performed by: OBSTETRICS & GYNECOLOGY

## 2024-10-15 PROCEDURE — 83036 HEMOGLOBIN GLYCOSYLATED A1C: CPT

## 2024-10-15 PROCEDURE — 99214 OFFICE O/P EST MOD 30 MIN: CPT | Performed by: OBSTETRICS & GYNECOLOGY

## 2024-10-15 PROCEDURE — 84443 ASSAY THYROID STIM HORMONE: CPT

## 2024-10-15 PROCEDURE — 1036F TOBACCO NON-USER: CPT | Performed by: OBSTETRICS & GYNECOLOGY

## 2024-10-15 PROCEDURE — 36415 COLL VENOUS BLD VENIPUNCTURE: CPT

## 2024-10-15 PROCEDURE — 84146 ASSAY OF PROLACTIN: CPT

## 2024-10-15 PROCEDURE — 82627 DEHYDROEPIANDROSTERONE: CPT

## 2024-10-15 PROCEDURE — 99395 PREV VISIT EST AGE 18-39: CPT | Performed by: OBSTETRICS & GYNECOLOGY

## 2024-10-15 PROCEDURE — 88175 CYTOPATH C/V AUTO FLUID REDO: CPT

## 2024-10-15 RX ORDER — METFORMIN HYDROCHLORIDE 500 MG/1
500 TABLET, EXTENDED RELEASE ORAL
Qty: 100 TABLET | Refills: 3 | Status: SHIPPED | OUTPATIENT
Start: 2024-10-15 | End: 2025-11-19

## 2024-10-15 NOTE — PROGRESS NOTES
Brie Cordova is a 24 y.o. female who presents with a chief complaint of Annual Exam      SUBJECTIVE  Patient presents for her annual.  She is having problems with polycystic ovarian syndrome.  She states that she has not had blood in work done in some time.  She is currently on birth control but would like to treat that the causes of her problems.  We discussed weight loss and taking metformin.  She could be switched Provera just for with withdrawal bleeds but she like to go over that after she has her blood work    Past Medical History:   Diagnosis Date    Chlamydial infection, unspecified 08/03/2018    Chlamydia infection    Contact with and (suspected) exposure to infections with a predominantly sexual mode of transmission 01/17/2019    STD exposure    Encounter for initial prescription of contraceptive pills 03/09/2020    Encounter for initial prescription of contraceptive pills    Encounter for screening for infections with a predominantly sexual mode of transmission 03/09/2020    Screening for STDs (sexually transmitted diseases)    Encounter for screening for malignant neoplasm of cervix 04/25/2018    Cervical cancer screening    Encounter for screening for other infectious and parasitic diseases 05/10/2018    Screening for chlamydial disease    Personal history of other diseases of the female genital tract 09/17/2019    History of vaginal discharge    Personal history of other specified conditions 08/01/2018    History of dysuria    Personal history of urinary (tract) infections 05/01/2019    History of urinary tract infection    Trichomonal vulvovaginitis 01/17/2019    Trichomonal vulvovaginitis    Unspecified symptoms and signs involving the genitourinary system 03/09/2020    UTI symptoms    Urinary tract infection, site not specified 11/15/2018    Acute lower UTI     Past Surgical History:   Procedure Laterality Date    OTHER SURGICAL HISTORY  06/17/2020    No history of surgery     Social History  "    Socioeconomic History    Marital status: Single   Occupational History    Occupation: [employer]   Tobacco Use    Smoking status: Never    Smokeless tobacco: Never   Vaping Use    Vaping status: Some Days    Substances: Nicotine    Devices: Disposable   Substance and Sexual Activity    Alcohol use: Yes    Drug use: Yes     Types: Marijuana    Sexual activity: Yes     Partners: Male     Family History   Problem Relation Name Age of Onset    No Known Problems Mother      No Known Problems Father         OB History    Para Term  AB Living   0 0 0 0 0 0   SAB IAB Ectopic Multiple Live Births   0 0 0 0 0       OBJECTIVE  No Known Allergies   (Not in a hospital admission)       Review of Systems  History obtained from the patient  General ROS: negative  Psychological ROS: negative  Gastrointestinal ROS: no abdominal pain, change in bowel habits, or black or bloody stools  Musculoskeletal ROS: negative  Physical Exam  General Appearance: awake, alert, oriented, in no acute distress, well developed, well nourished, and in no acute distress  Skin: there are no suspicious lesions or rashes of concern, skin color, texture, turgor are normal; there are no bruises, rashes or lesions.  Head/Face: NCAT  Eyes: No gross abnormalities., PERRL, and EOMI  Neck: neck- supple, no mass, non-tender  Back: no pain to palpation  Abdomen: Soft, non-tender, normal bowel sounds; no bruits, organomegaly or masses.  Extremities: Extremities warm to touch, pink, with no edema.  Musculoskeletal: negative  Urogen: External genitalia: Normal, Vagina: Normal, Cervix: Normal, and Bimanual exam: Normal    /82   Ht 1.702 m (5' 7\")   Wt 92.6 kg (204 lb 3.2 oz)   LMP  (LMP Unknown) Comment: 3 wks ago  BMI 31.98 kg/m²    Problem List Items Addressed This Visit       PCOS (polycystic ovarian syndrome)    Relevant Medications    metFORMIN XR (Glucophage-XR) 500 mg 24 hr tablet    Other Relevant Orders    Hemoglobin A1C    " Testosterone    DHEA-Sulfate    Prolactin    TSH with reflex to Free T4 if abnormal     Other Visit Diagnoses       Women's annual routine gynecological examination    -  Primary    Relevant Orders    THINPREP PAP

## 2024-10-23 ENCOUNTER — APPOINTMENT (OUTPATIENT)
Dept: ENDOCRINOLOGY | Facility: CLINIC | Age: 24
End: 2024-10-23
Payer: COMMERCIAL

## 2024-10-28 LAB
CYTOLOGY CMNT CVX/VAG CYTO-IMP: NORMAL
LAB AP HPV GENOTYPE QUESTION: YES
LAB AP HPV HR: NORMAL
LABORATORY COMMENT REPORT: NORMAL
PATH REPORT.TOTAL CANCER: NORMAL

## 2024-11-19 ENCOUNTER — APPOINTMENT (OUTPATIENT)
Dept: ENDOCRINOLOGY | Facility: CLINIC | Age: 24
End: 2024-11-19
Payer: COMMERCIAL

## 2025-01-07 ENCOUNTER — APPOINTMENT (OUTPATIENT)
Dept: ENDOCRINOLOGY | Facility: CLINIC | Age: 25
End: 2025-01-07
Payer: COMMERCIAL

## 2025-01-12 DIAGNOSIS — E28.2 PCOS (POLYCYSTIC OVARIAN SYNDROME): ICD-10-CM

## 2025-01-13 RX ORDER — SPIRONOLACTONE 100 MG/1
100 TABLET, FILM COATED ORAL 2 TIMES DAILY
Qty: 180 TABLET | Refills: 1 | Status: SHIPPED | OUTPATIENT
Start: 2025-01-13

## 2025-02-04 ENCOUNTER — APPOINTMENT (OUTPATIENT)
Dept: ENDOCRINOLOGY | Facility: CLINIC | Age: 25
End: 2025-02-04
Payer: COMMERCIAL

## 2025-02-05 ENCOUNTER — APPOINTMENT (OUTPATIENT)
Dept: ENDOCRINOLOGY | Facility: CLINIC | Age: 25
End: 2025-02-05
Payer: COMMERCIAL

## 2025-03-04 ENCOUNTER — APPOINTMENT (OUTPATIENT)
Dept: ENDOCRINOLOGY | Facility: CLINIC | Age: 25
End: 2025-03-04
Payer: COMMERCIAL

## 2025-04-24 DIAGNOSIS — E28.2 PCOS (POLYCYSTIC OVARIAN SYNDROME): ICD-10-CM

## 2025-04-24 RX ORDER — DROSPIRENONE AND ETHINYL ESTRADIOL 0.02-3(28)
1 KIT ORAL DAILY
Qty: 84 TABLET | Refills: 3 | Status: SHIPPED | OUTPATIENT
Start: 2025-04-24

## 2025-05-30 ENCOUNTER — OFFICE VISIT (OUTPATIENT)
Dept: URGENT CARE | Age: 25
End: 2025-05-30

## 2025-05-30 VITALS
HEART RATE: 91 BPM | DIASTOLIC BLOOD PRESSURE: 81 MMHG | TEMPERATURE: 98.2 F | BODY MASS INDEX: 31.95 KG/M2 | OXYGEN SATURATION: 97 % | WEIGHT: 204 LBS | SYSTOLIC BLOOD PRESSURE: 138 MMHG | RESPIRATION RATE: 20 BRPM

## 2025-05-30 DIAGNOSIS — L50.9 URTICARIA: Primary | ICD-10-CM

## 2025-05-30 RX ORDER — PREDNISONE 10 MG/1
TABLET ORAL
Qty: 24 TABLET | Refills: 0 | Status: SHIPPED | OUTPATIENT
Start: 2025-05-30 | End: 2025-06-08

## 2025-05-30 ASSESSMENT — ENCOUNTER SYMPTOMS
JOINT SWELLING: 0
HEADACHES: 0
VOMITING: 0
STRIDOR: 0
FEVER: 0
ARTHRALGIAS: 0
NAUSEA: 0
CHILLS: 0
TROUBLE SWALLOWING: 0
SHORTNESS OF BREATH: 0
WHEEZING: 0
ABDOMINAL PAIN: 0
CHEST TIGHTNESS: 0
COUGH: 0

## 2025-05-30 NOTE — PATIENT INSTRUCTIONS
Thank you for letting me care for you today.  You have been seen today for a rash of unknown origin  Start Zyrtec 2 times a day  Start prednisone taper  You can continue to use Benadryl every 6 hours as needed however the steroid should prevent you from needing it  Please follow up with your primary care provider/pediatrician as directed.   If one is needed, please call [phone].  Please seek care in emergency room for red flags as discussed during visit.

## 2025-05-30 NOTE — PROGRESS NOTES
Subjective   Patient ID: Brie Cordova is a 25 y.o. female. They present today with a chief complaint of Rash (Rash started Wednesday, resolved with benadryl and is returning.  C/o itching. No new med, foods, products).    History of Present Illness  25-year-old female here today for evaluation of rash.  Patient states it started on Wednesday and resolved with Benadryl.  Woke up in the middle of the night and rash was on bilateral upper extremities, bilateral lower extremities, face, and eyelids.  Patient took Benadryl which decreased the redness but she still remains itchy.  Denies new soaps, lotions, medications, pets, foods.  Was camping last weekend.  Denies increased work of breathing, use of accessory muscle, cyanosis, apnea, wheezing, stridor, shortness of breath.      Rash  Pertinent negatives include no cough, fever, shortness of breath or vomiting.       Past Medical History  Allergies as of 05/30/2025    (No Known Allergies)       Prescriptions Prior to Admission[1]     Medical History[2]    Surgical History[3]     reports that she has never smoked. She has never used smokeless tobacco. She reports current alcohol use. She reports current drug use. Drug: Marijuana.    Review of Systems  Review of Systems   Constitutional:  Negative for chills and fever.   HENT:  Negative for trouble swallowing.    Respiratory:  Negative for cough, chest tightness, shortness of breath, wheezing and stridor.    Cardiovascular:  Negative for chest pain.   Gastrointestinal:  Negative for abdominal pain, nausea and vomiting.   Musculoskeletal:  Negative for arthralgias and joint swelling.   Skin:  Positive for rash.   Neurological:  Negative for headaches.   All other systems reviewed and are negative.                                 Objective    Vitals:    05/30/25 1158   BP: 138/81   BP Location: Left arm   Patient Position: Sitting   BP Cuff Size: Adult   Pulse: 91   Resp: 20   Temp: 36.8 °C (98.2 °F)   TempSrc: Oral    SpO2: 97%   Weight: 92.5 kg (204 lb)     Patient's last menstrual period was 05/02/2025 (approximate).    Physical Exam  Constitutional:       General: She is not in acute distress.     Appearance: Normal appearance. She is not toxic-appearing.   HENT:      Head: Normocephalic and atraumatic.      Right Ear: Tympanic membrane, ear canal and external ear normal.      Left Ear: Tympanic membrane, ear canal and external ear normal.      Nose: Nose normal.      Mouth/Throat:      Mouth: Mucous membranes are moist.   Eyes:      Extraocular Movements: Extraocular movements intact.      Conjunctiva/sclera: Conjunctivae normal.      Pupils: Pupils are equal, round, and reactive to light.   Cardiovascular:      Rate and Rhythm: Normal rate and regular rhythm.   Pulmonary:      Effort: Pulmonary effort is normal.      Breath sounds: Normal breath sounds.   Musculoskeletal:      Cervical back: Neck supple.   Skin:     General: Skin is warm and dry.      Findings: Rash present.   Neurological:      General: No focal deficit present.      Mental Status: She is alert and oriented to person, place, and time.         Procedures    Point of Care Test & Imaging Results from this visit  No results found for this visit on 05/30/25.   Imaging  No results found.    Cardiology, Vascular, and Other Imaging  No other imaging results found for the past 2 days      Diagnostic study results (if any) were reviewed by Kristin L Schoenlein, APRN-CNP.    Assessment/Plan   Allergies, medications, history, and pertinent labs/EKGs/Imaging reviewed by Kristin L Schoenlein, APRN-CNP.     Medical Decision Making  VSS, NAD, Nontoxic appearing.  Current exam with scattered erythematous urticaria .  Patient has pictures on her phone symptoms of rash at different times for the last few days.     Symptom history, and exam are consistent with: Urticaria of unknown origin.  Will place patient on prednisone taper and have her do be twice daily Zyrtec.  Strict  ED precautions reviewed.    Differential Dx include, however, are not limited to: Anaphylaxis, contact dermatitis, tinea    I have a low suspicion for any acute pathologies requiring emergent evaluation and further workup at this time.  I believe patient is safe to discharge home with a low threshold for emergency room as discussed during visit.  We discussed close follow-up with primary care provider/pediatrician. Supportive care discussed.  Medication(s) profile of OTC and Rxed medication(s) if prescribed was (were) reviewed.  All questions answered and addressed.  Patient verbalized understanding.      Orders and Diagnoses  Diagnoses and all orders for this visit:  Urticaria  -     predniSONE (Deltasone) 10 mg tablet; Take 4 tablets (40 mg) by mouth once daily for 3 days, THEN 3 tablets (30 mg) once daily for 2 days, THEN 2 tablets (20 mg) once daily for 2 days, THEN 1 tablet (10 mg) once daily for 2 days.      Medical Admin Record      Patient disposition: Home    Electronically signed by Kristin L Schoenlein, APRN-CNP  12:35 PM           [1] (Not in a hospital admission)   [2]   Past Medical History:  Diagnosis Date    Chlamydial infection, unspecified 08/03/2018    Chlamydia infection    Contact with and (suspected) exposure to infections with a predominantly sexual mode of transmission 01/17/2019    STD exposure    Encounter for initial prescription of contraceptive pills 03/09/2020    Encounter for initial prescription of contraceptive pills    Encounter for screening for infections with a predominantly sexual mode of transmission 03/09/2020    Screening for STDs (sexually transmitted diseases)    Encounter for screening for malignant neoplasm of cervix 04/25/2018    Cervical cancer screening    Encounter for screening for other infectious and parasitic diseases 05/10/2018    Screening for chlamydial disease    Personal history of other diseases of the female genital tract 09/17/2019    History of vaginal  discharge    Personal history of other specified conditions 08/01/2018    History of dysuria    Personal history of urinary (tract) infections 05/01/2019    History of urinary tract infection    Trichomonal vulvovaginitis 01/17/2019    Trichomonal vulvovaginitis    Unspecified symptoms and signs involving the genitourinary system 03/09/2020    UTI symptoms    Urinary tract infection, site not specified 11/15/2018    Acute lower UTI   [3]   Past Surgical History:  Procedure Laterality Date    OTHER SURGICAL HISTORY  06/17/2020    No history of surgery

## 2025-07-21 ENCOUNTER — TELEPHONE (OUTPATIENT)
Dept: ENDOCRINOLOGY | Facility: CLINIC | Age: 25
End: 2025-07-21
Payer: COMMERCIAL

## 2025-07-21 DIAGNOSIS — F41.9 ANXIETY: Primary | ICD-10-CM

## 2025-07-21 DIAGNOSIS — E66.89 CONSTITUTIONAL OBESITY: ICD-10-CM

## 2025-07-22 ENCOUNTER — APPOINTMENT (OUTPATIENT)
Dept: PRIMARY CARE | Facility: CLINIC | Age: 25
End: 2025-07-22
Payer: COMMERCIAL

## 2025-07-22 ENCOUNTER — OFFICE VISIT (OUTPATIENT)
Dept: PRIMARY CARE | Facility: CLINIC | Age: 25
End: 2025-07-22
Payer: COMMERCIAL

## 2025-07-22 VITALS
SYSTOLIC BLOOD PRESSURE: 124 MMHG | HEART RATE: 92 BPM | HEIGHT: 67 IN | WEIGHT: 199.2 LBS | TEMPERATURE: 97.4 F | DIASTOLIC BLOOD PRESSURE: 85 MMHG | BODY MASS INDEX: 31.27 KG/M2

## 2025-07-22 DIAGNOSIS — Z76.89 ENCOUNTER TO ESTABLISH CARE WITH NEW DOCTOR: ICD-10-CM

## 2025-07-22 DIAGNOSIS — F41.9 ANXIETY: Primary | ICD-10-CM

## 2025-07-22 DIAGNOSIS — E28.2 PCOS (POLYCYSTIC OVARIAN SYNDROME): ICD-10-CM

## 2025-07-22 PROCEDURE — 3008F BODY MASS INDEX DOCD: CPT | Performed by: STUDENT IN AN ORGANIZED HEALTH CARE EDUCATION/TRAINING PROGRAM

## 2025-07-22 PROCEDURE — 99214 OFFICE O/P EST MOD 30 MIN: CPT | Performed by: STUDENT IN AN ORGANIZED HEALTH CARE EDUCATION/TRAINING PROGRAM

## 2025-07-22 PROCEDURE — 1036F TOBACCO NON-USER: CPT | Performed by: STUDENT IN AN ORGANIZED HEALTH CARE EDUCATION/TRAINING PROGRAM

## 2025-07-22 RX ORDER — SERTRALINE HYDROCHLORIDE 25 MG/1
25 TABLET, FILM COATED ORAL DAILY
Qty: 90 TABLET | Refills: 1 | Status: SHIPPED | OUTPATIENT
Start: 2025-07-22

## 2025-07-22 ASSESSMENT — PATIENT HEALTH QUESTIONNAIRE - PHQ9
1. LITTLE INTEREST OR PLEASURE IN DOING THINGS: NOT AT ALL
SUM OF ALL RESPONSES TO PHQ9 QUESTIONS 1 AND 2: 0
2. FEELING DOWN, DEPRESSED OR HOPELESS: NOT AT ALL

## 2025-07-22 ASSESSMENT — ENCOUNTER SYMPTOMS
HEADACHES: 0
WHEEZING: 0
FATIGUE: 0
DIZZINESS: 0
DIARRHEA: 0
LOSS OF SENSATION IN FEET: 0
FEVER: 0
CONSTIPATION: 0
DIFFICULTY URINATING: 0
SHORTNESS OF BREATH: 0
ADENOPATHY: 0
NAUSEA: 0
CHILLS: 0
VOMITING: 0
DEPRESSION: 1
COUGH: 0
COLOR CHANGE: 0
OCCASIONAL FEELINGS OF UNSTEADINESS: 0
ABDOMINAL PAIN: 0

## 2025-07-22 NOTE — ASSESSMENT & PLAN NOTE
- chronic stable issue, initial encounter  - continue with spironolactone and OCP  - managed by OBGYN  - discussed w pt that if she needs a refill before seeing OBGYN she can call our office and we will send a refill

## 2025-07-22 NOTE — PATIENT INSTRUCTIONS
Please make a follow up in 3 months for your anxiety.    Please let us know if you need refills for your spironolactone or birth control medication. You can call our office during working hours - Mon through Fri 8 AM to 5 PM to request a refill.

## 2025-07-22 NOTE — ASSESSMENT & PLAN NOTE
-chronic uncontrolled issue, initial encounter  - start zoloft  - titrate med in 3 months  - continue with plan for counseling and coping skills

## 2025-07-22 NOTE — PROGRESS NOTES
"  [facility] [place] [facility]  [address], [address]  [place] [address]    Subjective     Patient ID: Brie Cordova is a 25 y.o. female who presents for  Establish Care (anxiety)     She has had these symptoms for the past 1 year. She feels like she is always having some restless, anxious, stressed feelings, she will have episodes of high anxiety once a week. She will having high heart rate, sweating, fearful and anxious. She doesn't feel like she has a fear of having another episode. Some times gets some low mood too.     No hyper moods or risk taking. No manic moods. No SI/HI.    She made an appt to see a counselor for coping skills and therapy.    No hx of substance use    No hx of recent trauma or stressors    No hx of chronic herbal supplement. Tried ashwaganda for 1 week did not help.        Review of Systems   Constitutional:  Negative for chills, fatigue and fever.   HENT:  Negative for congestion.    Eyes:  Negative for visual disturbance.   Respiratory:  Negative for cough, shortness of breath and wheezing.    Cardiovascular:  Negative for chest pain.   Gastrointestinal:  Negative for abdominal pain, constipation, diarrhea, nausea and vomiting.   Genitourinary:  Negative for difficulty urinating.   Musculoskeletal:  Negative for gait problem.   Skin:  Negative for color change.   Neurological:  Negative for dizziness, syncope and headaches.   Hematological:  Negative for adenopathy.       Social History[1]  Family History[2]  RX Allergies[3]   Current Medications[4]    /85 (BP Location: Right arm, Patient Position: Sitting, BP Cuff Size: Adult)   Pulse 92   Temp 36.3 °C (97.4 °F) (Temporal)   Ht 1.702 m (5' 7\")   Wt 90.4 kg (199 lb 3.2 oz)   BMI 31.20 kg/m²      Objective   Physical Exam  Vitals reviewed.   Constitutional:       Appearance: Normal appearance.     Eyes:      Extraocular Movements: Extraocular movements intact.      Pupils: Pupils are equal, round, and reactive " "to light.       Cardiovascular:      Rate and Rhythm: Normal rate and regular rhythm.      Pulses: Normal pulses.      Heart sounds: Normal heart sounds.   Pulmonary:      Effort: Pulmonary effort is normal.      Breath sounds: Normal breath sounds.     Musculoskeletal:         General: Normal range of motion.      Cervical back: Normal range of motion and neck supple.     Neurological:      General: No focal deficit present.      Mental Status: She is alert.     Psychiatric:         Mood and Affect: Mood normal.         Behavior: Behavior normal.           Labs:   Lab Results   Component Value Date    WBC 10.5 06/16/2020    HGB 15.4 06/16/2020    HCT 46.3 (H) 06/16/2020     06/16/2020    TSH 1.83 10/15/2024     Lab Results   Component Value Date     10/17/2023    K 4.3 10/17/2023     10/17/2023    BUN 12 10/17/2023    CREATININE 0.70 10/17/2023    GLUCOSE 82 10/17/2023    CALCIUM 9.3 10/17/2023    PROT 8.0 05/29/2018    BILITOT 1.5 (H) 05/29/2018    ALKPHOS 69 05/29/2018    AST 37 05/29/2018    ALT 31 05/29/2018     Lab Results   Component Value Date    CHOL 199 10/17/2023      Lab Results   Component Value Date    TRIG 101 10/17/2023      Lab Results   Component Value Date    HDL 54.1 10/17/2023     Lab Results   Component Value Date    LDLCALC 125 (H) 10/17/2023      Lab Results   Component Value Date    VLDL 20 10/17/2023    No components found for: \"CHOLHDLRATI0\"    No data recorded    Imaging/Testing: US thyroid  Narrative: Interpreted By:  Manuel Rosales,   STUDY:  US THYROID; ;  2/22/2024 3:00 pm      INDICATION:  Signs/Symptoms:look for nodules.      COMPARISON:  None.      ACCESSION NUMBER(S):  [id number]      ORDERING CLINICIAN:  GIANNA HURTADO      TECHNIQUE:  Sonographic survey of the thyroid gland was performed.      FINDINGS:  Thyroid gland is not enlarged and the thyroid parenchyma appears  homogeneous. Right thyroid lobe measures 5.8 x 1.5 x 1.6 cm in the  left " thyroid lobe measures 5.3 x 1.1 x 1.6 cm. Thyroid isthmus  measures 2 mm in AP diameter. No discrete nodule is demonstrated.      Impression: Unremarkable thyroid ultrasound.          MACRO:  None      Signed by: Manuel Rosales 2/23/2024 5:50 PM  Dictation workstation:   [id number]    Assessment/Plan   Problem List Items Addressed This Visit       PCOS (polycystic ovarian syndrome)    - chronic stable issue, initial encounter  - continue with spironolactone and OCP  - managed by OBGYN  - discussed w pt that if she needs a refill before seeing OBGYN she can call our office and we will send a refill         Anxiety - Primary    -chronic uncontrolled issue, initial encounter  - start zoloft  - titrate med in 3 months  - continue with plan for counseling and coping skills         Relevant Medications    sertraline (Zoloft) 25 mg tablet     Other Visit Diagnoses         Encounter to establish care with new doctor                orders and follow up as documented in EMR, lab results reviewed with patient, repeat labs ordered prior to next appointment, reviewed medications and side effects in detail     Return visit in 3 months.  Anxiety med titration         AMAURY GRANADOS MD, [facility]  Department of Family Medicine of [facility] - [facility]         [1]   Social History  Tobacco Use    Smoking status: Never    Smokeless tobacco: Never   Vaping Use    Vaping status: Never Used   Substance Use Topics    Alcohol use: Yes     Alcohol/week: 1.0 standard drink of alcohol     Types: 1 Glasses of wine per week    Drug use: Not Currently   [2]   Family History  Problem Relation Name Age of Onset    No Known Problems Mother      No Known Problems Father      Depression Maternal Grandmother Jackie 50 - 59    Depression Paternal Grandfather Ray 60 - 69   [3] No Known Allergies  [4]   Current Outpatient Medications   Medication Sig Dispense Refill    spironolactone (Aldactone) 100 mg tablet TAKE 1  TABLET BY MOUTH TWICE A  tablet 1    Vestura, 28, 3-0.02 mg tablet TAKE 1 TABLET BY MOUTH EVERY DAY 84 tablet 3    sertraline (Zoloft) 25 mg tablet Take 1 tablet (25 mg) by mouth once daily. 90 tablet 1     No current facility-administered medications for this visit.

## 2025-10-21 ENCOUNTER — APPOINTMENT (OUTPATIENT)
Dept: PRIMARY CARE | Facility: CLINIC | Age: 25
End: 2025-10-21
Payer: COMMERCIAL

## 2025-10-22 ENCOUNTER — APPOINTMENT (OUTPATIENT)
Dept: PRIMARY CARE | Facility: CLINIC | Age: 25
End: 2025-10-22
Payer: COMMERCIAL